# Patient Record
Sex: FEMALE | URBAN - METROPOLITAN AREA
[De-identification: names, ages, dates, MRNs, and addresses within clinical notes are randomized per-mention and may not be internally consistent; named-entity substitution may affect disease eponyms.]

---

## 2019-04-26 ENCOUNTER — APPOINTMENT (RX ONLY)
Dept: URBAN - METROPOLITAN AREA CLINIC 95 | Facility: CLINIC | Age: 29
Setting detail: DERMATOLOGY
End: 2019-04-26

## 2019-04-26 DIAGNOSIS — L51.1 STEVENS-JOHNSON SYNDROME: ICD-10-CM

## 2019-04-26 PROBLEM — L30.9 DERMATITIS, UNSPECIFIED: Status: ACTIVE | Noted: 2019-04-26

## 2019-04-26 PROCEDURE — 99243 OFF/OP CNSLTJ NEW/EST LOW 30: CPT

## 2019-04-26 PROCEDURE — ? REFERRAL

## 2019-04-26 PROCEDURE — ? ADDITIONAL NOTES

## 2019-04-26 PROCEDURE — ? COUNSELING

## 2019-04-26 PROCEDURE — ? TREATMENT REGIMEN

## 2019-04-26 ASSESSMENT — LOCATION ZONE DERM: LOCATION ZONE: MUCOUS_MEMBRANE

## 2019-04-26 ASSESSMENT — LOCATION SIMPLE DESCRIPTION DERM: LOCATION SIMPLE: RIGHT LATERAL TONGUE

## 2019-04-26 ASSESSMENT — LOCATION DETAILED DESCRIPTION DERM: LOCATION DETAILED: RIGHT LATERAL TONGUE

## 2019-04-26 NOTE — PROCEDURE: TREATMENT REGIMEN
Continue Regimen: Medrol Dose Pack, Doxycycline 100mg and Magic Mouthwash as direct from PCP
Detail Level: Zone

## 2019-04-26 NOTE — HPI: ULCER
Is This A New Presentation, Or A Follow-Up?: Ulcers
How Severe Is Your Sore?: moderate
Additional History: Patient reports that she has had this condition prior starting January 2019 every month for approximately 1 week and then it goes away.  She had bloodwork done at that time and she reports all WNL.  She saw her primary doctor a few days ago and was given valaciclovir 1gm, a Medrol Dose pack, doxycycline 100mg, Magic Mouth Wash PRN  Patient has a history of taken Naproxen on a regular basis however doesn’t recall taking it prior to this out break.  Denies any other ulcers/sores on her body.  Reports having some nosebleeds in the past few months